# Patient Record
Sex: MALE
[De-identification: names, ages, dates, MRNs, and addresses within clinical notes are randomized per-mention and may not be internally consistent; named-entity substitution may affect disease eponyms.]

---

## 2018-01-02 ENCOUNTER — HOSPITAL ENCOUNTER (EMERGENCY)
Dept: HOSPITAL 42 - ED | Age: 63
Discharge: LEFT BEFORE DISCHARGE | End: 2018-01-02
Payer: COMMERCIAL

## 2018-01-02 VITALS
DIASTOLIC BLOOD PRESSURE: 130 MMHG | OXYGEN SATURATION: 96 % | SYSTOLIC BLOOD PRESSURE: 187 MMHG | TEMPERATURE: 97.5 F | RESPIRATION RATE: 18 BRPM | HEART RATE: 99 BPM

## 2018-01-02 VITALS — BODY MASS INDEX: 38.3 KG/M2

## 2018-01-02 DIAGNOSIS — Z48.02: Primary | ICD-10-CM

## 2018-01-02 NOTE — ED PDOC
Arrival/HPI





- General


Chief Complaint: Suture/Staple Removal


Time Seen by Provider: 01/02/18 11:55


Historian: Patient





- History of Present Illness


Narrative History of Present Illness (Text): 





01/03/18 09:13


63yo male with PMHx of hypertension present to ED for suture removal from the 

left 4th finger. Patient denies redness, purulent discharge, pain to area.





Past Medical History





- Provider Review


Nursing Documentation Reviewed: Yes





- Infectious Disease


Hx of Infectious Diseases: None





- Cardiac


Hx Cardiac Disorders: Yes


Hx Hypertension: Yes





- Pulmonary


Hx Respiratory Disorders: No





- Neurological


Hx Neurological Disorder: No





- HEENT


Hx HEENT Disorder: Yes (EYE IRRITATION FROM DUST AT WORK)





- Renal


Hx Renal Disorder: No





- Endocrine/Metabolic


Hx Endocrine Disorders: No





- Hematological/Oncological


Hx Blood Disorders: No





- Integumentary


Hx Dermatological Disorder: No





- Musculoskeletal/Rheumatological


Hx Musculoskeletal Disorders: Yes (KNEE PAIN)





- Gastrointestinal


Hx Gastrointestinal Disorders: No





- Genitourinary/Gynecological


Hx Genitourinary Disorders: Yes


Hx Prostate Problems: Yes (ELEVATED PSA/BPH)





- Psychiatric


Hx Psychophysiologic Disorder: No


Hx Anxiety: No


Hx Bipolar Disorder: No


Hx Depression: No


Hx Emotional Abuse: No


Hx Hallucinations: No


Hx Panic Disorder: No


Hx Post Traumatic Stress Disorder: No


Hx Psychosis: No


Hx Physical Abuse: No


Hx Schizophrenia: No


Hx Sexual Abuse: No


Hx Substance Use: No





- Surgical History


Hx Inguinal Hernia Repair: Yes (l)





- Anesthesia


Hx Anesthesia: Yes


Hx Anesthesia Reactions: No


Hx Malignant Hyperthermia: No





- Suicidal Assessment


Feels Threatened In Home Enviroment: No





Family/Social History





- Physician Review


Nursing Documentation Reviewed: Yes


Family/Social History: Unknown Family HX


Smoking Status: Heavy Smoker > 10 Cigarettes Daily


Hx Alcohol Use: Yes


Hx Substance Use: No


Hx Substance Use Treatment: No





Allergies/Home Meds


Allergies/Adverse Reactions: 


Allergies





No Known Allergies Allergy (Verified 10/01/15 14:36)


 








Home Medications: 


 Home Meds











 Medication  Instructions  Recorded  Confirmed


 


Losartan/Hydrochlorothiazide 1 tab PO DAILY 01/02/18 01/02/18





[Losartan-Hctz 100-25 mg Tab]   














Review of Systems





- Physician Review


All systems were reviewed & negative as marked: Yes





- Review of Systems


Constitutional: Normal


Eyes: Normal


ENT: Normal


Respiratory: Normal


Cardiovascular: Normal


Gastrointestinal: Normal


Genitourinary Male: Normal


Musculoskeletal: Normal


Skin: Other (suture removal)


Neurological: Normal


Endocrine: Normal


Hemo/Lymphatic: Normal


Psychiatric: Normal





Physical Exam


Vital Signs Reviewed: Yes


Vital Signs











  Temp Pulse Resp BP Pulse Ox


 


 01/02/18 11:49  97.5 F L  99 H  18  187/130 H  96











Temperature: Afebrile


Blood Pressure: Hypertensive


Pulse: Regular


Respiratory Rate: Normal


Appearance: Positive for: Well-Appearing, Non-Toxic, Comfortable


Pain Distress: None


Mental Status: Positive for: Alert and Oriented X 3





- Systems Exam


Head: Present: Atraumatic, Normocephalic


Pupils: Present: PERRL


Extroacular Muscles: Present: EOMI


Conjunctiva: Present: Normal


Mouth: Present: Moist Mucous Membranes


Neck: Present: Normal Range of Motion


Respiratory/Chest: Present: Clear to Auscultation, Good Air Exchange.  No: 

Respiratory Distress, Accessory Muscle Use


Cardiovascular: Present: Regular Rate and Rhythm, Normal S1, S2.  No: Murmurs


Abdomen: Present: Normal Bowel Sounds.  No: Tenderness, Distention, Peritoneal 

Signs


Back: Present: Normal Inspection


Upper Extremity: Present: Normal Inspection.  No: Cyanosis, Edema


Lower Extremity: Present: Normal Inspection.  No: Edema


Neurological: Present: GCS=15, CN II-XII Intact, Speech Normal


Skin: Present: Warm, Dry, Normal Color, Other (3sutures noted intact to left 

4th distal finger. No erythema. No purulent discharge. No sign of infection 

noted).  No: Rashes


Psychiatric: Present: Alert, Oriented x 3, Normal Insight, Normal Concentration





Medical Decision Making


ED Course and Treatment: 





01/03/18 09:15


Pt eloped from the ED before sutures could be removed and before re evaluation 

of his BP.





Disposition/Present on Arrival





- Present on Arrival


Any Indicators Present on Arrival: No


History of DVT/PE: No


History of Uncontrolled Diabetes: No


Urinary Catheter: No


History of Decub. Ulcer: No


History Surgical Site Infection Following: None





- Disposition


Have Diagnosis and Disposition been Completed?: Yes


Diagnosis: 


 Visit for suture removal





Disposition: LEFT W/O TREATMENT - ER ONLY


Disposition Time: 12:45


Condition: GOOD


Discharge Instructions (ExitCare):  Stitches Removal (ED)


Additional Instructions: 


Follow up with your Doctor


Return to ED for any new symptom


Referrals: 


Corey Benedict APN [Primary Care Provider] - Follow up with primary


Forms:  ZAO Begun (English)

## 2018-05-23 ENCOUNTER — HOSPITAL ENCOUNTER (INPATIENT)
Dept: HOSPITAL 42 - ED | Age: 63
LOS: 1 days | Discharge: LEFT BEFORE DISCHARGE | DRG: 749 | End: 2018-05-24
Attending: HOSPITALIST | Admitting: INTERNAL MEDICINE
Payer: COMMERCIAL

## 2018-05-23 VITALS — BODY MASS INDEX: 40.9 KG/M2

## 2018-05-23 VITALS — RESPIRATION RATE: 20 BRPM

## 2018-05-23 DIAGNOSIS — K27.9: ICD-10-CM

## 2018-05-23 DIAGNOSIS — N40.0: ICD-10-CM

## 2018-05-23 DIAGNOSIS — F10.129: Primary | ICD-10-CM
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
## 2018-05-23 DIAGNOSIS — Z91.14: ICD-10-CM

## 2018-05-23 DIAGNOSIS — Z91.19: ICD-10-CM

## 2018-05-23 DIAGNOSIS — F41.9: ICD-10-CM

## 2018-05-23 DIAGNOSIS — K29.20: ICD-10-CM

## 2018-05-23 DIAGNOSIS — F17.210: ICD-10-CM

## 2018-05-23 DIAGNOSIS — F32.9: ICD-10-CM

## 2018-05-23 DIAGNOSIS — I10: ICD-10-CM

## 2018-05-23 DIAGNOSIS — K92.1: ICD-10-CM

## 2018-05-23 LAB
ALBUMIN SERPL-MCNC: 3.8 G/DL (ref 3–4.8)
ALBUMIN/GLOB SERPL: 1.5 {RATIO} (ref 1.1–1.8)
ALT SERPL-CCNC: 41 U/L (ref 7–56)
AMORPH SED URNS QL MICRO: (no result)
APPEARANCE UR: CLEAR
AST SERPL-CCNC: 40 U/L (ref 17–59)
BACTERIA #/AREA URNS HPF: (no result) /[HPF]
BASOPHILS # BLD AUTO: 0.06 K/MM3 (ref 0–2)
BASOPHILS NFR BLD: 0.9 % (ref 0–3)
BILIRUB UR-MCNC: NEGATIVE MG/DL
BNP SERPL-MCNC: 141 PG/ML (ref 0–450)
BUN SERPL-MCNC: 14 MG/DL (ref 7–21)
CALCIUM SERPL-MCNC: 7.9 MG/DL (ref 8.4–10.5)
COLOR UR: YELLOW
EOSINOPHIL # BLD: 0.1 10*3/UL (ref 0–0.7)
EOSINOPHIL NFR BLD: 1.6 % (ref 1.5–5)
EPI CELLS #/AREA URNS HPF: (no result) /HPF (ref 0–5)
ERYTHROCYTE [DISTWIDTH] IN BLOOD BY AUTOMATED COUNT: 13.5 % (ref 11.5–14.5)
GFR NON-AFRICAN AMERICAN: > 60
GLUCOSE UR STRIP-MCNC: NEGATIVE MG/DL
GRANULOCYTES # BLD: 4.64 10*3/UL (ref 1.4–6.5)
GRANULOCYTES NFR BLD: 67.9 % (ref 50–68)
HGB BLD-MCNC: 14.8 G/DL (ref 14–18)
INR PPP: 1.11 (ref 0.93–1.08)
LEUKOCYTE ESTERASE UR-ACNC: NEGATIVE LEU/UL
LIPASE SERPL-CCNC: 34 U/L (ref 23–300)
LYMPHOCYTES # BLD: 1.6 10*3/UL (ref 1.2–3.4)
LYMPHOCYTES NFR BLD AUTO: 23.5 % (ref 22–35)
MCH RBC QN AUTO: 29.4 PG (ref 25–35)
MCHC RBC AUTO-ENTMCNC: 33.8 G/DL (ref 31–37)
MCV RBC AUTO: 87.1 FL (ref 80–105)
MONOCYTES # BLD AUTO: 0.4 10*3/UL (ref 0.1–0.6)
MONOCYTES NFR BLD: 6.1 % (ref 1–6)
PH UR STRIP: 8 [PH] (ref 4.7–8)
PLATELET # BLD: 239 10^3/UL (ref 120–450)
PMV BLD AUTO: 8.9 FL (ref 7–11)
PROT UR STRIP-MCNC: 30 MG/DL
PROTHROMBIN TIME: 12.8 SECONDS (ref 9.4–12.5)
RBC # BLD AUTO: 5.03 10^6/UL (ref 3.5–6.1)
RBC # UR STRIP: NEGATIVE /UL
RBC #/AREA URNS HPF: (no result) /HPF (ref 0–2)
SP GR UR STRIP: 1.01 (ref 1–1.03)
TROPONIN I SERPL-MCNC: < 0.01 NG/ML
UROBILINOGEN UR STRIP-ACNC: 1 E.U./DL
WBC # BLD AUTO: 6.8 10^3/UL (ref 4.5–11)
WBC #/AREA URNS HPF: (no result) /HPF (ref 0–6)

## 2018-05-23 NOTE — CP.PCM.HP
<FernandezKeo rodriguez - Last Filed: 05/23/18 14:09>





History of Present Illness





- History of Present Illness


History of Present Illness: 





Medicine H&P for Dr. Jackson


cc: alcohol intoxication





62 year old Lebanese-speaking male with past medical history of HTN, history of 

alcohol abuse, anxiety/depression and noncompliance presents to the [facility] [place] for 

alcohol intoxication. Patient is reporting nausea/vomiting, diarrhea, and 

abdominal pain since yesterday. He reports that he has been drinking alcohol 

since Thursday. Patient had similar episodes of alcohol abuse in the past. He 

has alternated between months of sobriety and episodes of alcohol consumption. 

He states that he had one occurrence of bright red blood in his stool. He also 

reports increased somnolence. Denies tremors and hallucinations. Patient 

currently rates pain as moderate. He describes it as constant burning in 

epigastrium. Denies any alleviating or aggravating factors. Patient denies 

recent illness or sick contacts. Admits to fever/chills, hematochezia, dizziness

/lightheadedness, vertigo. Denies chest pain, SOB, palpitations, constipation, 

urinary symptoms and incontinence. 





PMD: [facility]





PMH: HTN, history of alcohol abuse, anxiety/depression, noncompliance, BPH


Meds: Name unknown but takes HTN med


Allergy: NKDA


PSH: umbilical hernia repair, Left inguinal hernia repair


FH: non-contributory


Social: current smoker - 1 pack/day for years, heavy EtOH binge drinking 

minimum of 12 beers, denies illicit drug drug





Present on Admission





- Present on Admission


Any Indicators Present on Admission: No


History of DVT/PE: No


History of Uncontrolled Diabetes: No


Urinary Catheter: No


Decubitus Ulcer Present: No


History Surgical Site Infection Following: None





Review of Systems





- Review of Systems


All systems: reviewed and no additional remarkable complaints except (as per HPI

)





Past Patient History





- Infectious Disease


Hx of Infectious Diseases: None





- Past Medical History & Family History


Past Medical History?: Yes





- Past Social History


Smoking Status: Heavy Smoker > 10 Cigarettes Daily





- CARDIAC


Hx Cardiac Disorders: Yes


Hx Hypertension: Yes





- PULMONARY


Hx Respiratory Disorders: No





- NEUROLOGICAL


Hx Neurological Disorder: No





- HEENT


Hx HEENT Problems: Yes (EYE IRRITATION FROM DUST AT WORK)





- RENAL


Hx Chronic Kidney Disease: No





- ENDOCRINE/METABOLIC


Hx Endocrine Disorders: No





- HEMATOLOGICAL/ONCOLOGICAL


Hx Blood Disorders: No





- INTEGUMENTARY


Hx Dermatological Problems: No





- MUSCULOSKELETAL/RHEUMATOLOGICAL


Hx Musculoskeletal Disorders: Yes (KNEE PAIN)





- GASTROINTESTINAL


Hx Gastrointestinal Disorders: No





- GENITOURINARY/GYNECOLOGICAL


Hx Genitourinary Disorders: Yes


Hx Prostate Problems: Yes (ELEVATED PSA/BPH)





- PSYCHIATRIC


Hx Psychophysiologic Disorder: No


Hx Anxiety: No


Hx Bipolar Disorder: No


Hx Depression: No


Hx Emotional Abuse: No


Hx Hallucinations: No


Hx Panic Symptoms: No


Hx Post Traumatic Stress Disorder: No


Hx Psychosis: No


Hx Physical Abuse: No


Hx Schizophrenia: No


Hx Sexual Abuse: No


Hx Substance Use: No





- SURGICAL HISTORY


Hx Surgeries: Yes


Hx Herniorrhaphy: Yes (BILATERAL ING.HERNIORRAPHY)





- ANESTHESIA


Hx Anesthesia: Yes


Hx Anesthesia Reactions: No


Hx Malignant Hyperthermia: No





Meds


Allergies/Adverse Reactions: 


 Allergies











Allergy/AdvReac Type Severity Reaction Status Date / Time


 


No Known Allergies Allergy   Verified 10/01/15 14:36














Physical Exam





- Constitutional


Appears: Other (lethargic)





- Head Exam


Head Exam: ATRAUMATIC, NORMOCEPHALIC





- Eye Exam


Eye Exam: EOMI, Normal appearance


Pupil Exam: PERRL





- ENT Exam


ENT Exam: Mucous Membranes Dry





- Neck Exam


Neck exam: Positive for: Normal Inspection





- Respiratory Exam


Respiratory Exam: NORMAL BREATHING PATTERN





- Cardiovascular Exam


Cardiovascular Exam: REGULAR RHYTHM, +S1, +S2





- GI/Abdominal Exam


GI & Abdominal Exam: Normal Bowel Sounds, Soft, Tenderness (epigastric).  absent

: Distended, Firm, Guarding, Hernia, Rebound, Rigid





- Extremities Exam


Extremities exam: Positive for: normal capillary refill, pedal pulses present.  

Negative for: calf tenderness





- Back Exam


Back exam: absent: CVA tenderness (L), CVA tenderness (R)





- Neurological Exam


Neurological exam: Alert





- Psychiatric Exam


Psychiatric exam: Flat Affect





- Skin


Skin Exam: Dry, Intact, Normal Color, Warm





Results





- Vital Signs


Recent Vital Signs: 





 Last Vital Signs











Temp  98.3 F   05/23/18 09:40


 


Pulse  86   05/23/18 11:24


 


Resp  18   05/23/18 11:24


 


BP  148/94 H  05/23/18 11:24


 


Pulse Ox  98   05/23/18 11:24














- Labs


Result Diagrams: 


 05/23/18 09:50





 05/23/18 09:50





Assessment & Plan





- Assessment and Plan (Free Text)


Assessment: 





62 year old Lebanese-speaking male with past medical history of HTN, history of 

alcohol abuse, anxiety/depression and noncompliance presents to the [facility] for 

alcohol intoxication.


Plan: 





1. Alcohol intoxication/withdrawal


-Remote telemetry


-CIWA protocol


-Aspiration protocol


-Seizure protocol


-Neurochecks


-Banana bag 100 cc/hr


-Ativan 2 mg IVP Q4H PRN


-Intake and Output


-Folic acid


-Thiamine


-MVM








2. HTN


Monitor BP


will start med if persistently high





3. Abdominal pain


gastritis vs PUD


Protonix 40 mg Q12H


Serial abd exams


Monitor bowel function





4. Hematochezia


stool occult test


Protonix


Monitor





5. Prophylactic Measure


Protonix


Lovenox


CLD








Discussed with Dr. Renetta So PGY1


[phone]





<Joshua Jackson - Last Filed: 05/23/18 16:35>





Results





- Vital Signs


Recent Vital Signs: 





 Last Vital Signs











Temp  98.3 F   05/23/18 12:35


 


Pulse  79   05/23/18 12:56


 


Resp  18   05/23/18 12:56


 


BP  145/92 H  05/23/18 12:56


 


Pulse Ox  98   05/23/18 12:56














- Labs


Result Diagrams: 


 05/23/18 09:50





 05/23/18 09:50


Labs: 





 Laboratory Results - last 24 hr











  05/23/18





  12:30


 


Blood Type Confirm  O POSITIVE














Attending/Attestation





- Attestation


I have personally seen and examined this patient.: Yes


I have fully participated in the care of the patient.: Yes


I have reviewed all pertinent clinical information: Yes


Notes (Text): 





Patient seen and examined


Agree with above


Patient admitted for etoh intoxication c/o abdominal pain most likely 

attributed to etoh gastritits


IVF hydration with bananabag infusion, Monitor for withdrawal symptoms, CIWA 

protocol


Questionable hematochezia, will send stool for occult blood. Start Protonix


Clear liquids - advance as tolerated


Sister at bedside reports pt drinks more than he says he does.

## 2018-05-23 NOTE — ED PDOC
Arrival/HPI





- General


Chief Complaint: Alcohol Ingestion


Time Seen by Provider: 05/23/18 09:46


Historian: Patient, Spouse, Family (Niece)





- History of Present Illness


Narrative History of Present Illness (Text): 





05/23/18 09:49





62 year old Romansh-speaking male, with past medical history of hypertension (

noncompliant with medication) and psychiatric history of alcohol abuse, anxiety 

and depression, presents to the Emergency department complaining of nausea, 

vomiting, stomach burning and hematochezia since yesterday. Lauren at bedside is 

translating for patient. As per nirashard, patient has been drinking alcohol since 

Thursday and is unable to stop. Niece states patient had similar prior episodes 

of alcohol abuse which followed months of sobriety until alcohol consumption 

again. As translated by lauren, patient informs bright red blood in his stool 

and increased somnolence. Upon questioning wife informs patient feels sick and 

shaky but no hallucination or DT when withdrawing alcohol. Patient currently 

denies any other somatic complaints. Patient denies any fever, chills, chest 

pain, shortness of breath, substance abuse or any other complaints. Patient 

presents to the Emergency department for medical evaluation. 





PMD: NO PMD (Visits [facility], [place], operated by [facility] in [place])


Time/Duration: < week


Symptom Onset: Gradual


Symptom Course: Unchanged


Quality: Burning


Activities at Onset: Light


Context: Other (Drinking Alcohol)





Past Medical History





- Provider Review


Nursing Documentation Reviewed: Yes





- Infectious Disease


Hx of Infectious Diseases: None





- Cardiac


Hx Cardiac Disorders: Yes


Hx Hypertension: Yes





- Pulmonary


Hx Respiratory Disorders: No





- Neurological


Hx Neurological Disorder: No





- HEENT


Hx HEENT Disorder: Yes (EYE IRRITATION FROM DUST AT WORK)





- Renal


Hx Renal Disorder: No





- Endocrine/Metabolic


Hx Endocrine Disorders: No





- Hematological/Oncological


Hx Blood Disorders: No





- Integumentary


Hx Dermatological Disorder: No





- Musculoskeletal/Rheumatological


Hx Musculoskeletal Disorders: Yes (KNEE PAIN)





- Gastrointestinal


Hx Gastrointestinal Disorders: No





- Genitourinary/Gynecological


Hx Genitourinary Disorders: Yes


Hx Prostate Problems: Yes (ELEVATED PSA/BPH)





- Psychiatric


Hx Psychophysiologic Disorder: No


Hx Anxiety: No


Hx Bipolar Disorder: No


Hx Depression: No


Hx Emotional Abuse: No


Hx Hallucinations: No


Hx Panic Disorder: No


Hx Post Traumatic Stress Disorder: No


Hx Psychosis: No


Hx Physical Abuse: No


Hx Schizophrenia: No


Hx Sexual Abuse: No


Hx Substance Use: No





- Surgical History


Hx Inguinal Hernia Repair: Yes (l)





- Anesthesia


Hx Anesthesia: Yes


Hx Anesthesia Reactions: No


Hx Malignant Hyperthermia: No





- Suicidal Assessment


Feels Threatened In Home Enviroment: No





Family/Social History





- Physician Review


Nursing Documentation Reviewed: Yes


Family/Social History: No Known Family HX


Smoking Status: Heavy Smoker > 10 Cigarettes Daily


Hx Alcohol Use: Yes


Hx Substance Use: No


Hx Substance Use Treatment: No





Allergies/Home Meds


Allergies/Adverse Reactions: 


Allergies





No Known Allergies Allergy (Verified 10/01/15 14:36)


 








Home Medications: 


 Home Meds











 Medication  Instructions  Recorded  Confirmed


 


Losartan/Hydrochlorothiazide 1 tab PO DAILY 01/02/18 05/23/18





[Losartan-Hctz 100-25 mg Tab]   














Review of Systems





- Physician Review


All systems were reviewed & negative as marked: Yes





- Review of Systems


Constitutional: Normal.  absent: Fevers


Eyes: Normal


ENT: Normal


Respiratory: Normal.  absent: SOB


Cardiovascular: Normal.  absent: Chest Pain


Gastrointestinal: Abdominal Pain, Nausea, Vomiting, Hematochezia, Hematemesis


Genitourinary Male: Normal


Musculoskeletal: Normal


Skin: Normal


Neurological: Other (Intoxicated)


Endocrine: Normal


Hemo/Lymphatic: Normal


Psychiatric: Anxiety, Depression





Physical Exam


Vital Signs Reviewed: Yes


Vital Signs











  Temp Pulse Resp BP Pulse Ox


 


 05/23/18 11:24   86  18  148/94 H  98


 


 05/23/18 09:40  98.3 F  98 H  19  152/112 H  98











Temperature: Afebrile


Blood Pressure: Hypertensive


Pulse: Tachycardic


Respiratory Rate: Normal


Appearance: Positive for: Other (Strong Alcohol smell at breath. )


Pain Distress: None


Mental Status: Positive for: Alert and Oriented X 3, other





- Systems Exam


Head: Present: Atraumatic, Normocephalic


Pupils: Present: PERRL


Extroacular Muscles: Present: EOMI


Conjunctiva: Present: Normal


Respiratory/Chest: Present: Clear to Auscultation, Good Air Exchange.  No: 

Respiratory Distress, Accessory Muscle Use


Cardiovascular: Present: Regular Rate and Rhythm, Normal S1, S2.  No: Murmurs


Abdomen: Present: Tenderness, Distention.  No: Peritoneal Signs


Upper Extremity: Present: Normal Inspection.  No: Cyanosis, Edema


Lower Extremity: Present: Normal Inspection.  No: Edema


Neurological: Present: GCS=15, CN II-XII Intact, Speech Normal


Skin: Present: Warm, Dry, Normal Color.  No: Rashes


Psychiatric: Present: Alert, Oriented x 3, Intoxicated





Medical Decision Making


ED Course and Treatment: 





05/23/18 9:47





Impression: 62 year old male presents to the Emergency department for nausea, 

hematemesis, hematochezia, stomach burning and alcohol intoxication.





Plan:


-- Blood Type and Screen


-- Labs


-- IV Fluids


-- Urinalysis


-- Reassess and disposition





Progress Notes:


 


05/23/18 12:35


EKG: Ordered, reviewed, and independently interpreted the EKG.


Rate : 83 BPM


Rhythm : NSR


Interpretation : LVH








- Lab Interpretations


Lab Results: 








 05/23/18 09:50 





 05/23/18 09:50 





 Lab Results





05/23/18 10:20: Urine Opiates Screen Negative, Urine Methadone Screen Negative, 

Ur Barbiturates Screen Negative, Ur Phencyclidine Scrn Negative, Ur 

Amphetamines Screen Negative, U Benzodiazepines Scrn Negative, U Oth Cocaine 

Metabols Negative, U Cannabinoids Screen Negative


05/23/18 10:20: Urine Color Yellow, Urine Appearance Clear, Urine pH 8.0, Ur 

Specific Gravity 1.015, Urine Protein 30 H, Urine Glucose (UA) Negative, Urine 

Ketones Negative, Urine Blood Negative, Urine Nitrate Negative, Urine Bilirubin 

Negative, Urine Urobilinogen 1.0 H, Ur Leukocyte Esterase Negative, Urine RBC 0 

- 2, Urine WBC 0 - 2, Ur Epithelial Cells None, Amorphous Sediment Few, Urine 

Bacteria Many, Urine Other Uyeast


05/23/18 09:50: Blood Type O POSITIVE, Antibody Screen Negative, BBK History 

Checked No verified bt


05/23/18 09:50: Alcohol, Quantitative 228 H


05/23/18 09:50: Sodium 145, Potassium 3.9, Chloride 104, Carbon Dioxide 26, 

Anion Gap 19, BUN 14, Creatinine 0.7 L, Est GFR ( Amer) > 60, Est GFR (

Non-Af Amer) > 60, Random Glucose 134 H, Calcium 7.9 L, Total Bilirubin 0.4, 

AST 40, ALT 41, Alkaline Phosphatase 65, Lactate Dehydrogenase 619, Total 

Creatine Kinase 206, Troponin I < 0.01  D, NT-Pro-B Natriuret Pep 141, Total 

Protein 6.4, Albumin 3.8, Globulin 2.6, Albumin/Globulin Ratio 1.5, Lipase 34


05/23/18 09:50: PT 12.8 H, INR 1.11 H


05/23/18 09:50: WBC 6.8  D, RBC 5.03, Hgb 14.8, Hct 43.8, MCV 87.1, MCH 29.4, 

MCHC 33.8, RDW 13.5, Plt Count 239, MPV 8.9, Gran % 67.9, Lymph % (Auto) 23.5, 

Mono % (Auto) 6.1 H, Eos % (Auto) 1.6, Baso % (Auto) 0.9, Gran # 4.64, Lymph # (

Auto) 1.6, Mono # (Auto) 0.4, Eos # (Auto) 0.1, Baso # (Auto) 0.06











- EKG Interpretation


Interpreted by ED Physician: Yes


Type: 12 lead EKG





- Medication Orders


Current Medication Orders: 








Enoxaparin Sodium (Lovenox)  40 mg SC DAILY MANUEL


   PRN Reason: Protocol


Folic Acid (Folic Acid)  1 mg PO DAILY [facility]


Multivitamins/Vitamin C 10 ml/Thiamine HCl 100 mg/ Folic Acid 1 mg/ Sodium 

Chloride  1,011.2 mls @ 100 mls/hr IV .Q10H7M MANUEL


Lorazepam (Ativan)  1 mg PO Q4H PRN


   PRN Reason: Symptoms of alcohol withdrawl


Multivitamins/Minerals (Therapeutic-M Tab)  1 tab PO DAILY [facility]


Ondansetron HCl (Zofran Inj)  4 mg IVP Q6 PRN


   PRN Reason: Nausea/Vomiting


Pantoprazole Sodium (Protonix Inj)  40 mg IVP DAILY [facility]


Thiamine HCl (Vitamin B1 Tab)  100 mg PO DAILY [facility]





Discontinued Medications





Al Hydrox/Mg Hydrox/Simethicone (Maalox Plus 30 Ml)  30 ml PO STAT STA


   Stop: 05/23/18 11:10


   Last Admin: 05/23/18 12:11  Dose: 30 ml





Belladonna/Phenobarbital (Donnatal Elixir)  5 ml PO STAT STA


   Stop: 05/23/18 11:10


   Last Admin: 05/23/18 12:11  Dose: 5 ml





Famotidine (Pepcid)  40 mg IVP STAT STA


   Stop: 05/23/18 11:26


   Last Admin: 05/23/18 12:12  Dose: 40 mg





IVP Administration


 Document     05/23/18 12:12  SS  (Rec: 05/23/18 12:12  SS  [id number])


     Charges for Administration


      # of IVP Administrations                   1





Multivitamins/Vitamin C 10 ml/Thiamine HCl 100 mg/ Folic Acid 1 mg/ Sodium 

Chloride  1,011.2 mls @ 1,000 mls/hr IV .Q1H1M ONE


   Stop: 05/23/18 10:47


   Last Admin: 05/23/18 10:24  Dose: 1,000 mls/hr





eMAR Start Stop


 Document     05/23/18 10:24  GMD  (Rec: 05/23/18 10:25  GMD  [id number])


     Intravenous Solution


      Start Date                                 05/23/18


      Start Time                                 10:24


      End Date                                   05/23/18


      End time                                   11:25


      Total Infusion Time                        61





Lidocaine HCl (Lidocaine 2% Viscous)  15 ml MM STAT STA


   Stop: 05/23/18 11:10


   Last Admin: 05/23/18 12:11  Dose: 15 ml





Lorazepam (Ativan)  2 mg IVP ONCE ONE


   PRN Reason: Protocol


   Stop: 05/23/18 11:26


   Last Admin: 05/23/18 12:12  Dose: 2 mg





IVP Administration


 Document     05/23/18 12:12  SS  (Rec: 05/23/18 12:12  SS  [id number])


     Charges for Administration


      # of IVP Administrations                   1





Pantoprazole Sodium (Protonix Inj)  80 mg IVP STAT STA


   Stop: 05/23/18 11:26


   Last Admin: 05/23/18 12:11  Dose: 80 mg





IVP Administration


 Document     05/23/18 12:11  SS  (Rec: 05/23/18 12:11  SS  [id number])


     Charges for Administration


      # of IVP Administrations                   1














- Scribe Statement


The provider has reviewed the documentation as recorded by the Scribe


Lucia Freire.





All medical record entries made by the Scribe were at my direction and 

personally dictated by me. I have reviewed the chart and agree that the record 

accurately reflects my personal performance of the history, physical exam, 

medical decision making, and the department course for this patient. I have 

also personally directed, reviewed, and agree with the discharge instructions 

and disposition.





Disposition/Present on Arrival





- Present on Arrival


Any Indicators Present on Arrival: No


History of DVT/PE: No


History of Uncontrolled Diabetes: No


Urinary Catheter: No


History of Decub. Ulcer: No


History Surgical Site Infection Following: None





- Disposition


Have Diagnosis and Disposition been Completed?: Yes


Diagnosis: 


 Alcohol intoxication, Upper GI bleeding





Disposition: HOSPITALIZED


Disposition Time: 11:35


Patient Plan: Admission


Patient Problems: 


 Current Active Problems











Problem Status Onset


 


Alcohol intoxication Acute  


 


Upper GI bleeding Acute  











Condition: FAIR

## 2018-05-24 VITALS
SYSTOLIC BLOOD PRESSURE: 151 MMHG | HEART RATE: 114 BPM | DIASTOLIC BLOOD PRESSURE: 95 MMHG | TEMPERATURE: 98.9 F | OXYGEN SATURATION: 95 %

## 2018-05-24 LAB
ALBUMIN SERPL-MCNC: 4 G/DL (ref 3–4.8)
ALBUMIN/GLOB SERPL: 1.5 {RATIO} (ref 1.1–1.8)
ALT SERPL-CCNC: 56 U/L (ref 7–56)
APTT BLD: 25.4 SECONDS (ref 25.1–36.5)
AST SERPL-CCNC: 55 U/L (ref 17–59)
BASOPHILS # BLD AUTO: 0.04 K/MM3 (ref 0–2)
BASOPHILS NFR BLD: 0.4 % (ref 0–3)
BUN SERPL-MCNC: 12 MG/DL (ref 7–21)
CALCIUM SERPL-MCNC: 8.3 MG/DL (ref 8.4–10.5)
EOSINOPHIL # BLD: 0.4 10*3/UL (ref 0–0.7)
EOSINOPHIL NFR BLD: 3.6 % (ref 1.5–5)
ERYTHROCYTE [DISTWIDTH] IN BLOOD BY AUTOMATED COUNT: 13.3 % (ref 11.5–14.5)
GFR NON-AFRICAN AMERICAN: > 60
GRANULOCYTES # BLD: 6.94 10*3/UL (ref 1.4–6.5)
GRANULOCYTES NFR BLD: 69.9 % (ref 50–68)
HGB BLD-MCNC: 15.5 G/DL (ref 14–18)
INR PPP: 1.03 (ref 0.93–1.08)
LYMPHOCYTES # BLD: 1.8 10*3/UL (ref 1.2–3.4)
LYMPHOCYTES NFR BLD AUTO: 18.4 % (ref 22–35)
MCH RBC QN AUTO: 29.6 PG (ref 25–35)
MCHC RBC AUTO-ENTMCNC: 34 G/DL (ref 31–37)
MCV RBC AUTO: 87 FL (ref 80–105)
MONOCYTES # BLD AUTO: 0.8 10*3/UL (ref 0.1–0.6)
MONOCYTES NFR BLD: 7.7 % (ref 1–6)
PLATELET # BLD: 240 10^3/UL (ref 120–450)
PMV BLD AUTO: 9.3 FL (ref 7–11)
PROTHROMBIN TIME: 11.8 SECONDS (ref 9.4–12.5)
RBC # BLD AUTO: 5.24 10^6/UL (ref 3.5–6.1)
WBC # BLD AUTO: 9.9 10^3/UL (ref 4.5–11)

## 2018-05-24 NOTE — CARD
--------------- APPROVED REPORT --------------





EKG Measurement

Heart [id number]

MN [id number]

[id number]

[id number]

[id number]



<Conclusion>

Normal sinus rhythm

Minimal voltage criteria for LVH, may be normal variant

## 2018-05-24 NOTE — CP.PCM.PN
<Keo So - Last Filed: 05/24/18 12:22>





Subjective





- Date & Time of Evaluation


Date of Evaluation: 05/24/18


Time of Evaluation: 07:30





- Subjective


Subjective: 





Medicine Note for Dr. Ayoub





Patient seen and examined at bedside. No acute event overnight. Patient 

required ativan multiple times overnight due to [facility] protocol. Patient is 

diaphoretic this morning with mild tremors. He denies pain. Denies fever/chills 

and nausea/vomiting.





Objective





- Vital Signs/Intake and Output


Vital Signs (last 24 hours): 


 











Temp Pulse Resp BP Pulse Ox


 


 98.8 F   110 H  20   174/102 H  98 


 


 05/24/18 08:18  05/24/18 10:43  05/24/18 08:18  05/24/18 10:43  05/24/18 08:18








Intake and Output: 


 











 05/24/18 05/24/18





 06:59 18:59


 


Intake Total 660 


 


Output Total 775 


 


Balance -115 














- Medications


Medications: 


 Current Medications





Chlordiazepoxide (Librium)  25 mg PO Q8 [facility]


   PRN Reason: Protocol


   Last Admin: 05/24/18 09:05 Dose:  25 mg


Enoxaparin Sodium (Lovenox)  40 mg SC DAILY [facility]


   PRN Reason: Protocol


   Last Admin: 05/24/18 09:06 Dose:  40 mg


Folic Acid (Folic Acid)  1 mg PO DAILY [facility]


   Last Admin: 05/24/18 09:06 Dose:  1 mg


Lisinopril (Zestril)  10 mg PO DAILY [facility]


   Last Admin: 05/24/18 10:43 Dose:  10 mg


Lorazepam (Ativan)  2 mg IVP Q4H PRN; Protocol


   PRN Reason: etoh withdrawal


   Last Admin: 05/24/18 06:31 Dose:  2 mg


Losartan Potassium (Cozaar)  25 mg PO DAILY [facility]


   Last Admin: 05/24/18 09:05 Dose:  25 mg


Multivitamins/Minerals (Therapeutic-M Tab)  1 tab PO DAILY [facility]


   Last Admin: 05/24/18 09:04 Dose:  1 tab


Ondansetron HCl (Zofran Inj)  4 mg IVP Q6 PRN


   PRN Reason: Nausea/Vomiting


   Last Admin: 05/24/18 01:26 Dose:  4 mg


Pantoprazole Sodium (Protonix Inj)  40 mg IVP BID [facility]


   Last Admin: 05/24/18 09:04 Dose:  40 mg


Thiamine HCl (Vitamin B1 Tab)  100 mg PO DAILY [facility]


   Last Admin: 05/24/18 09:06 Dose:  100 mg











- Labs


Labs: 


 





 05/24/18 06:00 





 05/24/18 06:00 





 











PT  11.8 SECONDS (9.4-12.5)   05/24/18  06:00    


 


INR  1.03  (0.93-1.08)   05/24/18  06:00    


 


APTT  25.4 Seconds (25.1-36.5)   05/24/18  06:00    














- Constitutional


Appears: No Acute Distress, Other (diaphoretic)





- Head Exam


Head Exam: ATRAUMATIC, NORMOCEPHALIC





- Eye Exam


Eye Exam: EOMI, Normal appearance


Pupil Exam: PERRL





- ENT Exam


ENT Exam: Mucous Membranes Moist





- Respiratory Exam


Respiratory Exam: Clear to Ausculation Bilateral, NORMAL BREATHING PATTERN





- Cardiovascular Exam


Cardiovascular Exam: Tachycardia





- GI/Abdominal Exam


GI & Abdominal Exam: Soft, Normal Bowel Sounds.  absent: Tenderness





- Extremities Exam


Extremities Exam: Normal Capillary Refill





- Back Exam


Back Exam: absent: CVA tenderness (L), CVA tenderness (R)





- Neurological Exam


Neurological Exam: Alert, Awake, Oriented x3


Additional comments: 





mild tremor





- Psychiatric Exam


Psychiatric exam: Normal Affect, Normal Mood





- Skin


Skin Exam: Diaphoretic, Intact, Warm





Assessment and Plan





- Assessment and Plan (Free Text)


Assessment: 





62 year old Citizen of the [place]-speaking male with past medical history of HTN, history of 

alcohol abuse, anxiety/depression and noncompliance [place] the [facility] [place] 

alcohol intoxication.


Plan: 





1. Alcohol intoxication/withdrawal


-Remote telemetry


-CIWA protocol


-Aspiration protocol


-Seizure protocol


-Neurochecks


-Banana bag 150 cc/hr


-Librium 25 mg PO Q6H


-Clonidine 0.1 Q6H PRN


-Ativan 2 mg IVP Q4H PRN


-Intake and Output


-Folic acid


-Thiamine


-MVM








2. HTN


Lisinopril 10mg PO daily


Clonidine 0.1 Q6H PRN


Monitor BP


will start med if persistently high





3. Abdominal pain; Resolved


gastritis vs PUD


Protonix 40 mg Q12H


Serial abd exams


Monitor bowel function


Lipase normal





4. Hematochezia


stool occult test


Protonix


Monitor





5. Prophylactic Measure


Protonix


Lovenox


CLD





Discussed with Dr. Anitra So PGY1


[phone]





<Grace Ayoub B - Last Filed: 05/24/18 16:21>





Objective





- Vital Signs/Intake and Output


Vital Signs (last 24 hours): 


 











Temp Pulse Resp BP Pulse Ox


 


 98.8 F   105 H  20   179/92 H  98 


 


 05/24/18 08:18  05/24/18 15:40  05/24/18 08:18  05/24/18 15:40  05/24/18 08:18








Intake and Output: 


 











 05/24/18 05/24/18





 06:59 18:59


 


Intake Total 660 925


 


Output Total 775 


 


Balance -115 925














- Medications


Medications: 


 Current Medications





Chlordiazepoxide (Librium)  25 mg PO Q8 [facility]


   PRN Reason: Protocol


   Last Admin: 05/24/18 13:35 Dose:  25 mg


Clonidine HCl (Catapres)  0.1 mg PO Q6H PRN


   PRN Reason: Systolic Blood Pressure


   Last Admin: 05/24/18 12:30 Dose:  0.1 mg


Enoxaparin Sodium (Lovenox)  40 mg SC DAILY [facility]


   PRN Reason: Protocol


   Last Admin: 05/24/18 09:06 Dose:  40 mg


Folic Acid (Folic Acid)  1 mg PO DAILY [facility]


   Last Admin: 05/24/18 09:06 Dose:  1 mg


Lisinopril (Zestril)  10 mg PO DAILY [facility]


   Last Admin: 05/24/18 10:43 Dose:  10 mg


Lorazepam (Ativan)  2 mg IVP Q4H PRN; Protocol


   PRN Reason: etoh withdrawal


   Last Admin: 05/24/18 11:46 Dose:  2 mg


Losartan Potassium (Cozaar)  25 mg PO DAILY [facility]


   Last Admin: 05/24/18 09:05 Dose:  25 mg


Multivitamins/Minerals (Therapeutic-M Tab)  1 tab PO DAILY [facility]


   Last Admin: 05/24/18 09:04 Dose:  1 tab


Ondansetron HCl (Zofran Inj)  4 mg IVP Q6 PRN


   PRN Reason: Nausea/Vomiting


   Last Admin: 05/24/18 01:26 Dose:  4 mg


Pantoprazole Sodium (Protonix Ec Tab)  40 mg PO BID [facility]


Thiamine HCl (Vitamin B1 Tab)  100 mg PO DAILY [facility]


   Last Admin: 05/24/18 09:06 Dose:  100 mg











- Labs


Labs: 


 





 05/24/18 06:00 





 05/24/18 06:00 





 











PT  11.8 SECONDS (9.4-12.5)   05/24/18  06:00    


 


INR  1.03  (0.93-1.08)   05/24/18  06:00    


 


APTT  25.4 Seconds (25.1-36.5)   05/24/18  06:00    














Attending/Attestation





- Attestation


I have personally seen and examined this patient.: Yes


I have fully participated in the care of the patient.: Yes


I have reviewed all pertinent clinical information, including history, physical 

exam and plan: Yes


Notes (Text): 


I have seen and examined the patient at bedside. Agree with the above note with 

the following additions/ exceptions: Briefly this is 62 year old Citizen of the [place]-

speaking male with past medical history of HTN, history of alcohol abuse, 

anxiety/depression and noncompliance who came for management of alcohol 

intoxication. Continue CIWA protocol. Continue librium taper. Will closely 

monitor BP and electrolytes. Start clonidine prn.

## 2018-05-25 NOTE — CP.PCM.DIS
Provider





- Provider


Date of Admission: 


05/23/18 11:36





Attending physician: 


Grace Ayoub MD





Time Spent in preparation of Discharge (in minutes): 30





Hospital Course





- Lab Results


Lab Results: 


 Most Recent Lab Values











WBC  9.9 10^3/ul (4.5-11.0)  D 05/24/18  06:00    


 


RBC  5.24 10^6/uL (3.5-6.1)   05/24/18  06:00    


 


Hgb  15.5 g/dL (14.0-18.0)   05/24/18  06:00    


 


Hct  45.6 % (42.0-52.0)   05/24/18  06:00    


 


MCV  87.0 fl (80.0-105.0)   05/24/18  06:00    


 


MCH  29.6 pg (25.0-35.0)   05/24/18  06:00    


 


MCHC  34.0 g/dl (31.0-37.0)   05/24/18  06:00    


 


RDW  13.3 % (11.5-14.5)   05/24/18  06:00    


 


Plt Count  240 10^3/uL (120.0-450.0)   05/24/18  06:00    


 


MPV  9.3 fl (7.0-11.0)   05/24/18  06:00    


 


Gran %  69.9 % (50.0-68.0)  H  05/24/18  06:00    


 


Lymph % (Auto)  18.4 % (22.0-35.0)  L  05/24/18  06:00    


 


Mono % (Auto)  7.7 % (1.0-6.0)  H  05/24/18  06:00    


 


Eos % (Auto)  3.6 % (1.5-5.0)   05/24/18  06:00    


 


Baso % (Auto)  0.4 % (0.0-3.0)   05/24/18  06:00    


 


Gran #  6.94  (1.4-6.5)  H  05/24/18  06:00    


 


Lymph # (Auto)  1.8  (1.2-3.4)   05/24/18  06:00    


 


Mono # (Auto)  0.8  (0.1-0.6)  H  05/24/18  06:00    


 


Eos # (Auto)  0.4  (0.0-0.7)   05/24/18  06:00    


 


Baso # (Auto)  0.04 K/mm3 (0.0-2.0)   05/24/18  06:00    


 


PT  11.8 SECONDS (9.4-12.5)   05/24/18  06:00    


 


INR  1.03  (0.93-1.08)   05/24/18  06:00    


 


APTT  25.4 Seconds (25.1-36.5)   05/24/18  06:00    


 


Sodium  142 mmol/L (132-148)   05/24/18  06:00    


 


Potassium  4.0 mmol/L (3.6-5.0)   05/24/18  06:00    


 


Chloride  104 mmol/L ()   05/24/18  06:00    


 


Carbon Dioxide  26 mmol/L (21-33)   05/24/18  06:00    


 


Anion Gap  16  (10-20)   05/24/18  06:00    


 


BUN  12 mg/dL (7-21)   05/24/18  06:00    


 


Creatinine  0.8 mg/dl (0.8-1.5)   05/24/18  06:00    


 


Est GFR ( Amer)  > 60   05/24/18  06:00    


 


Est GFR (Non-Af Amer)  > 60   05/24/18  06:00    


 


POC Glucose (mg/dL)  124 mg/dL ()  H  05/23/18  10:05    


 


Random Glucose  98 mg/dL ()   05/24/18  06:00    


 


Calcium  8.3 mg/dL (8.4-10.5)  L  05/24/18  06:00    


 


Phosphorus  3.0 mg/dL (2.5-4.5)   05/24/18  06:00    


 


Magnesium  2.2 mg/dL (1.7-2.2)   05/24/18  06:00    


 


Total Bilirubin  1.0 mg/dL (0.2-1.3)   05/24/18  06:00    


 


AST  55 U/L (17-59)   05/24/18  06:00    


 


ALT  56 U/L (7-56)   05/24/18  06:00    


 


Alkaline Phosphatase  74 U/L ()   05/24/18  06:00    


 


Lactate Dehydrogenase  619 U/L (333-699)   05/23/18  09:50    


 


Total Creatine Kinase  206 U/L ()   05/23/18  09:50    


 


Troponin I  < 0.01 ng/mL D 05/23/18  09:50    


 


NT-Pro-B Natriuret Pep  141 pg/mL (0-450)   05/23/18  09:50    


 


Total Protein  6.6 g/dL (5.8-8.3)   05/24/18  06:00    


 


Albumin  4.0 g/dL (3.0-4.8)   05/24/18  06:00    


 


Globulin  2.6 gm/dL  05/24/18  06:00    


 


Albumin/Globulin Ratio  1.5  (1.1-1.8)   05/24/18  06:00    


 


Lipase  34 U/L ()   05/23/18  09:50    


 


Urine Color  Yellow  (YELLOW)   05/23/18  10:20    


 


Urine Appearance  Clear  (CLEAR)   05/23/18  10:20    


 


Urine pH  8.0  (4.7-8.0)   05/23/18  10:20    


 


Ur Specific Gravity  1.015  (1.005-1.035)   05/23/18  10:20    


 


Urine Protein  30 mg/dL (<30 mg/dL)  H  05/23/18  10:20    


 


Urine Glucose (UA)  Negative mg/dL (NEGATIVE)   05/23/18  10:20    


 


Urine Ketones  Negative mg/dL (NEGATIVE)   05/23/18  10:20    


 


Urine Blood  Negative  (NEGATIVE)   05/23/18  10:20    


 


Urine Nitrate  Negative  (NEGATIVE)   05/23/18  10:20    


 


Urine Bilirubin  Negative  (NEGATIVE)   05/23/18  10:20    


 


Urine Urobilinogen  1.0 E.U./dL (<1 E.U./dL)  H  05/23/18  10:20    


 


Ur Leukocyte Esterase  Negative Bhavani/uL (NEGATIVE)   05/23/18  10:20    


 


Urine RBC  0 - 2 /hpf (0-2)   05/23/18  10:20    


 


Urine WBC  0 - 2 /hpf (0-6)   05/23/18  10:20    


 


Ur Epithelial Cells  None /hpf (0-5)   05/23/18  10:20    


 


Amorphous Sediment  Few   05/23/18  10:20    


 


Urine Bacteria  Many  (NEG)   05/23/18  10:20    


 


Urine Other  Uyeast   05/23/18  10:20    


 


Urine Opiates Screen  Negative  (NEGATIVE)   05/23/18  10:20    


 


Urine Methadone Screen  Negative  (NEGATIVE)   05/23/18  10:20    


 


Ur Barbiturates Screen  Negative  (NEGATIVE)   05/23/18  10:20    


 


Ur Phencyclidine Scrn  Negative  (NEGATIVE)   05/23/18  10:20    


 


Ur Amphetamines Screen  Negative  (NEGATIVE)   05/23/18  10:20    


 


U Benzodiazepines Scrn  Negative  (NEGATIVE)   05/23/18  10:20    


 


U Oth Cocaine Metabols  Negative  (NEGATIVE)   05/23/18  10:20    


 


U Cannabinoids Screen  Negative  (NEGATIVE)   05/23/18  10:20    


 


Alcohol, Quantitative  228 mg/dL (0-10)  H  05/23/18  09:50    


 


Blood Type  O POSITIVE   05/23/18  09:50    


 


Blood Type Confirm  O POSITIVE   05/23/18  12:30    


 


Antibody Screen  Negative   05/23/18  09:50    


 


BBK History Checked  No verified bt   05/23/18  09:50    














- Hospital Course


Hospital Course: 


Patient is a 61yo [place]-speaking male with history of hypertension, alcohol 

abuse, anxiety/depression that presented to [facility] [place] with alcohol intoxication. On 

presentation he had reported nausea, non-bilious non-bloody vomiting, diarrhea 

and epigastric abdominal pain. He admitted to drinking excessive amounts of 

alcohol prior to his admission and had similar instances in the past. He 

reported that he would typically have months of sobriety with episodes of binge 

drinking in between. On admission, he was admitted to remote telemetry, started 

on CIWA protocol with seizure and aspiration precautions. He was given librium 

and ativan for signs/symptoms of alcohol withdrawal as well as treated for 

hypertension. He was also started on multivitamins, folic acid and thiamine. 

Lipase was within normal limits. Although improved since admission, he had 

symptoms of alcohol withdrawal and was advised to remain in the hospital for 

treatment of alcohol withdrawal. At approximately 7pm, patient reported that he 

wishes to leave. Despite attempts to dissuade him and explaining the risks/

benefits of leaving against medical advice, patient left [facility] [place]. 





Physical exam:


Please see note from 5/24/18 for physical examination





Discharge Exam





- Head Exam


Head Exam: ATRAUMATIC, NORMOCEPHALIC





Discharge Plan





- Follow Up Plan


Condition: FAIR


Disposition: AGAINST MEDICAL ADVICE

## 2018-05-27 ENCOUNTER — HOSPITAL ENCOUNTER (EMERGENCY)
Dept: HOSPITAL 42 - ED | Age: 63
Discharge: HOME | End: 2018-05-27
Payer: COMMERCIAL

## 2018-05-27 VITALS
OXYGEN SATURATION: 99 % | TEMPERATURE: 98.9 F | HEART RATE: 78 BPM | DIASTOLIC BLOOD PRESSURE: 95 MMHG | RESPIRATION RATE: 18 BRPM | SYSTOLIC BLOOD PRESSURE: 148 MMHG

## 2018-05-27 VITALS — BODY MASS INDEX: 40.6 KG/M2

## 2018-05-27 DIAGNOSIS — L08.9: Primary | ICD-10-CM

## 2018-05-27 DIAGNOSIS — I10: ICD-10-CM

## 2018-05-27 DIAGNOSIS — F17.210: ICD-10-CM

## 2018-05-27 NOTE — ED PDOC
Arrival/HPI





- General


Chief Complaint: Abnormal Skin Integrity


Time Seen by Provider: 05/27/18 10:42


Historian: Patient





- History of Present Illness


Narrative History of Present Illness (Text): 





05/27/18 10:42


This 63 yo male presents to this ED requesting antibiotic for upper lip 

infection.  Patient stated he tripped and hit his mouth again his bathroom sink 

x 3 days ago.  Patient noted inner upper lip laceration.  Patient denies FB 

within lip.  Denies other somatic complains.


Tetanus <1 year


Time/Duration: Other (see hpi)


Context: Home





Past Medical History





- Provider Review


Nursing Documentation Reviewed: Yes





- Infectious Disease


Hx of Infectious Diseases: None





- Cardiac


Hx Cardiac Disorders: Yes


Hx Hypertension: Yes





- Pulmonary


Hx Respiratory Disorders: No





- Neurological


Hx Neurological Disorder: No





- HEENT


Hx HEENT Disorder: Yes (EYE IRRITATION FROM DUST AT WORK)





- Renal


Hx Renal Disorder: No





- Endocrine/Metabolic


Hx Endocrine Disorders: No





- Hematological/Oncological


Hx Blood Disorders: No





- Integumentary


Hx Dermatological Disorder: No





- Musculoskeletal/Rheumatological


Hx Musculoskeletal Disorders: Yes (KNEE PAIN)





- Gastrointestinal


Hx Gastrointestinal Disorders: No





- Genitourinary/Gynecological


Hx Genitourinary Disorders: Yes


Hx Prostate Problems: Yes (ELEVATED PSA/BPH)





- Psychiatric


Hx Psychophysiologic Disorder: No


Hx Anxiety: No


Hx Bipolar Disorder: No


Hx Depression: No


Hx Emotional Abuse: No


Hx Hallucinations: No


Hx Panic Disorder: No


Hx Post Traumatic Stress Disorder: No


Hx Psychosis: No


Hx Physical Abuse: No


Hx Schizophrenia: No


Hx Sexual Abuse: No


Hx Substance Use: No





- Surgical History


Hx Inguinal Hernia Repair: Yes (l)





- Anesthesia


Hx Anesthesia: Yes


Hx Anesthesia Reactions: No


Hx Malignant Hyperthermia: No





- Suicidal Assessment


Feels Threatened In Home Enviroment: No





Family/Social History





- Physician Review


Nursing Documentation Reviewed: Yes


Family/Social History: Other (nocontributory)


Smoking Status: Heavy Smoker > 10 Cigarettes Daily


Hx Alcohol Use: Yes


Hx Substance Use: No


Hx Substance Use Treatment: No





Allergies/Home Meds


Allergies/Adverse Reactions: 


Allergies





No Known Allergies Allergy (Verified 10/01/15 14:36)


 








Home Medications: 


 Home Meds











 Medication  Instructions  Recorded  Confirmed


 


Losartan/Hydrochlorothiazide 1 tab PO DAILY 01/02/18 05/23/18





[Losartan-Hctz 100-25 mg Tab]   














Review of Systems





- Review of Systems


Constitutional: Normal.  absent: Fatigue, Weight Change, Fevers


Eyes: Normal


ENT: Other (see hpi)


Respiratory: Normal


Cardiovascular: Normal


Gastrointestinal: Normal


Genitourinary Male: Normal


Musculoskeletal: Normal


Skin: Normal


Neurological: Normal


Endocrine: Normal


Hemo/Lymphatic: Normal


Psychiatric: Normal





Physical Exam





Vital Signs











  Temp Pulse Resp BP Pulse Ox


 


 05/27/18 09:43  99.4 F  98 H  20  155/103 H  96











Temperature: Afebrile


Blood Pressure: Normal


Pulse: Regular


Respiratory Rate: Normal


Appearance: Positive for: Well-Appearing, Non-Toxic, Comfortable


Pain Distress: None


Mental Status: Positive for: Alert and Oriented X 3





- Systems Exam


Head: Present: Atraumatic, Normocephalic, Other (no raccoon sign.  No lizarraga 

sign)


Pupils: Present: PERRL, Other (no hyphema)


Extroacular Muscles: Present: EOMI.  No: Entrapment


Conjunctiva: Present: Normal


Ears: Present: Normal, NORMAL TM, Normal Canal, Other (no hemotymapnum).  No: 

Erythema, TM Bulging, Fluid, TM Perf


Mouth: Present: Moist Mucous Membranes, Normal Tounge, Normal Teeth, Other ((+) 

healing inner upper lip laceration, mils swelling.  No fluctuance, no drainage.

  No facial erythema.  No dental or gum abscess.  No dental tenderness).  No: 

Drooling, Trismus


Pharnyx: Present: Normal.  No: ERYTHEMA, EXUDATE, TONSILS ENLARGED


Neck: Present: Normal Range of Motion


Respiratory/Chest: Present: Clear to Auscultation, Good Air Exchange.  No: 

Respiratory Distress, Accessory Muscle Use, Wheezes, Retracting, Rhonchi, 

Tachypneic


Cardiovascular: Present: Regular Rate and Rhythm, Normal S1, S2.  No: Murmurs


Upper Extremity: Present: Normal Inspection, Normal ROM


Lower Extremity: Present: Normal Inspection, Normal ROM


Neurological: Present: GCS=15, CN II-XII Intact, Speech Normal, Motor Func 

Grossly Intact, Normal Sensory Function, Normal Cerebellar Funct, Gait Normal, 

Memory Normal


Skin: Present: Warm, Dry, Normal Color.  No: Rashes


Psychiatric: Present: Alert, Oriented x 3, Normal Insight





Medical Decision Making


ED Course and Treatment: 





05/27/18 10:51


Re-evaluation.  Patient feels better.  Discussed results and plan with patient 

who expresses understanding.  All questions answered and there is agreement 

with the plan to discharge home with instructions.  Patient stable for 

discharge.  Return if symptoms persist or worsen





Re-evaluation Time: 10:51


Reassessment Condition: Re-examined, Unchanged





Disposition/Present on Arrival





- Present on Arrival


Any Indicators Present on Arrival: No


History of DVT/PE: No


History of Uncontrolled Diabetes: No


Urinary Catheter: No


History of Decub. Ulcer: No


History Surgical Site Infection Following: None





- Disposition


Have Diagnosis and Disposition been Completed?: Yes


Diagnosis: 


 Infected wound, Hypertension





Disposition: HOME/ ROUTINE


Disposition Time: 10:52


Patient Plan: Discharge


Condition: GOOD


Discharge Instructions (ExitCare):  High Blood Pressure (DC), Low Salt Diet


Print Language: Tajik


Additional Instructions: 


Llame al doctor the la clinica para seguimiento medico.  Cerulean la medicina com 

esta prescrita.  Limpie la herida con agua y jabon.  Regrese a la emergencia si 

infection empeora.  limpie carole dientes alfonzo, y utilize el floss


Prescriptions: 


Cephalexin [Keflex] 500 mg PO QID #28 capsule


Chlorhexidine 0.12% [Peridex] 15 ml PO BID #1 bottle


Sulfamethoxazole/Trimethoprim [Bactrim  mg-160 mg] 1 tab PO BID #14 tab


Referrals: 


 Service [Outside] - Follow up with primary


[facility] [Outside] - Follow up with primary

## 2018-10-29 ENCOUNTER — HOSPITAL ENCOUNTER (EMERGENCY)
Dept: HOSPITAL 42 - ED | Age: 63
Discharge: HOME | End: 2018-10-29
Payer: SELF-PAY

## 2018-10-29 VITALS — BODY MASS INDEX: 40.6 KG/M2

## 2018-10-29 VITALS — DIASTOLIC BLOOD PRESSURE: 87 MMHG | SYSTOLIC BLOOD PRESSURE: 129 MMHG

## 2018-10-29 VITALS — OXYGEN SATURATION: 98 % | TEMPERATURE: 98.2 F | HEART RATE: 86 BPM | RESPIRATION RATE: 18 BRPM

## 2018-10-29 DIAGNOSIS — I10: ICD-10-CM

## 2018-10-29 DIAGNOSIS — F17.210: ICD-10-CM

## 2018-10-29 DIAGNOSIS — F10.129: Primary | ICD-10-CM
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
## 2018-10-29 LAB
ALBUMIN SERPL-MCNC: 3.9 G/DL (ref 3–4.8)
ALBUMIN/GLOB SERPL: 1.4 {RATIO} (ref 1.1–1.8)
ALT SERPL-CCNC: 40 U/L (ref 7–56)
APTT BLD: 25.4 SECONDS (ref 25.1–36.5)
AST SERPL-CCNC: 35 U/L (ref 17–59)
BASOPHILS # BLD AUTO: 0.02 K/MM3 (ref 0–2)
BASOPHILS NFR BLD: 0.3 % (ref 0–3)
BUN SERPL-MCNC: 14 MG/DL (ref 7–21)
CALCIUM SERPL-MCNC: 8.2 MG/DL (ref 8.4–10.5)
EOSINOPHIL # BLD: 0.2 10*3/UL (ref 0–0.7)
EOSINOPHIL NFR BLD: 2.5 % (ref 1.5–5)
ERYTHROCYTE [DISTWIDTH] IN BLOOD BY AUTOMATED COUNT: 13.6 % (ref 11.5–14.5)
GFR NON-AFRICAN AMERICAN: > 60
GRANULOCYTES # BLD: 3.88 10*3/UL (ref 1.4–6.5)
GRANULOCYTES NFR BLD: 56.1 % (ref 50–68)
HGB BLD-MCNC: 16 G/DL (ref 14–18)
INR PPP: 1.02
LYMPHOCYTES # BLD: 2.5 10*3/UL (ref 1.2–3.4)
LYMPHOCYTES NFR BLD AUTO: 35.9 % (ref 22–35)
MCH RBC QN AUTO: 30.7 PG (ref 25–35)
MCHC RBC AUTO-ENTMCNC: 34 G/DL (ref 31–37)
MCV RBC AUTO: 90.4 FL (ref 80–105)
MONOCYTES # BLD AUTO: 0.4 10*3/UL (ref 0.1–0.6)
MONOCYTES NFR BLD: 5.2 % (ref 1–6)
PLATELET # BLD: 223 10^3/UL (ref 120–450)
PMV BLD AUTO: 9.1 FL (ref 7–11)
PROTHROMBIN TIME: 11.6 SECONDS (ref 9.4–12.5)
RBC # BLD AUTO: 5.21 10^6/UL (ref 3.5–6.1)
WBC # BLD AUTO: 6.9 10^3/UL (ref 4.5–11)

## 2018-10-29 PROCEDURE — 99283 EMERGENCY DEPT VISIT LOW MDM: CPT

## 2018-10-29 PROCEDURE — 85025 COMPLETE CBC W/AUTO DIFF WBC: CPT

## 2018-10-29 PROCEDURE — 80053 COMPREHEN METABOLIC PANEL: CPT

## 2018-10-29 PROCEDURE — 85730 THROMBOPLASTIN TIME PARTIAL: CPT

## 2018-10-29 PROCEDURE — 85610 PROTHROMBIN TIME: CPT

## 2018-10-29 NOTE — ED PDOC
Arrival/HPI





- General


Chief Complaint: Alcohol Ingestion


Time Seen by Provider: 10/29/18 17:45


Historian: Patient





- History of Present Illness


Narrative History of Present Illness (Text): 





10/30/18 01:29


64yo male with pmhx of alcohol intoxication and hypertension bib EMS for alcohol

intoxication. Patient states " I drink too much". Per the EMS patient complained

of dizziness when he went to visit his neighbor and then the neighbor called 

EMS.  They stated that pt however reports drinking alcohol . Patient states the 

alcohol : make me not feel good". He however denies any focal complaint. Denies 

chest pain, headache, dizziness, nausea, abdominal pain, back pain, SOB, 

diaphoresis, SI/HI, any other complaint.














Past Medical History





- Provider Review


Nursing Documentation Reviewed: Yes





- Infectious Disease


Hx of Infectious Diseases: None





- Cardiac


Hx Cardiac Disorders: Yes


Hx Hypertension: Yes





- Pulmonary


Hx Respiratory Disorders: No





- Neurological


Hx Neurological Disorder: No





- HEENT


Hx HEENT Disorder: Yes (EYE IRRITATION FROM DUST AT WORK)





- Renal


Hx Renal Disorder: No





- Endocrine/Metabolic


Hx Endocrine Disorders: No





- Hematological/Oncological


Hx Blood Disorders: No





- Integumentary


Hx Dermatological Disorder: No





- Musculoskeletal/Rheumatological


Hx Musculoskeletal Disorders: Yes (KNEE PAIN)





- Gastrointestinal


Hx Gastrointestinal Disorders: No





- Genitourinary/Gynecological


Hx Genitourinary Disorders: Yes


Hx Prostate Problems: Yes (ELEVATED PSA/BPH)





- Psychiatric


Hx Psychophysiologic Disorder: No


Hx Anxiety: No


Hx Bipolar Disorder: No


Hx Depression: No


Hx Emotional Abuse: No


Hx Hallucinations: No


Hx Panic Disorder: No


Hx Post Traumatic Stress Disorder: No


Hx Psychosis: No


Hx Physical Abuse: No


Hx Schizophrenia: No


Hx Sexual Abuse: No


Hx Substance Use: No





- Surgical History


Hx Inguinal Hernia Repair: Yes (l)





- Anesthesia


Hx Anesthesia: Yes


Hx Anesthesia Reactions: No


Hx Malignant Hyperthermia: No





- Suicidal Assessment


Feels Threatened In Home Enviroment: No





Family/Social History





- Physician Review


Nursing Documentation Reviewed: Yes


Family/Social History: Unknown Family HX


Smoking Status: Heavy Smoker > 10 Cigarettes Daily


Hx Alcohol Use: Yes


Hx Substance Use: No


Hx Substance Use Treatment: No





Allergies/Home Meds


Allergies/Adverse Reactions: 


Allergies





No Known Allergies Allergy (Verified 10/01/15 14:36)


   








Home Medications: 


                                    Home Meds











 Medication  Instructions  Recorded  Confirmed


 


Losartan/Hydrochlorothiazide 1 tab PO DAILY 01/02/18 05/23/18





[Losartan-Hctz 100-25 mg Tab]   














Review of Systems





- Physician Review


All systems were reviewed & negative as marked: Yes





- Review of Systems


Systems not reviewed;Unavailable: Intoxicated


Constitutional: Normal


Eyes: Normal


ENT: Normal


Respiratory: Normal


Cardiovascular: Normal


Gastrointestinal: Normal


Genitourinary Male: Normal


Musculoskeletal: Normal


Skin: Normal


Neurological: Normal


Endocrine: Normal


Hemo/Lymphatic: Normal


Psychiatric: Normal





Physical Exam





- Physical Exam


Physical Exam Limitations: Intoxication


Vital Signs Reviewed: Yes





Vital Signs











  Temp Pulse Resp BP Pulse Ox


 


 10/29/18 18:34  98.2 F  86  18  141/81  98











Temperature: Afebrile


Blood Pressure: Normal


Pulse: Regular


Respiratory Rate: Normal


Appearance: Positive for: Well-Appearing, Non-Toxic, Comfortable


Pain Distress: None


Mental Status: Positive for: Alert and Oriented X 3





- Systems Exam


Head: Present: Atraumatic, Normocephalic


Pupils: Present: PERRL


Extroacular Muscles: Present: EOMI


Conjunctiva: Present: Normal


Mouth: Present: Moist Mucous Membranes


Neck: Present: Normal Range of Motion


Respiratory/Chest: Present: Clear to Auscultation, Good Air Exchange.  No: 

Respiratory Distress, Accessory Muscle Use


Cardiovascular: Present: Regular Rate and Rhythm, Normal S1, S2.  No: Murmurs


Abdomen: No: Tenderness, Distention, Peritoneal Signs


Back: Present: Normal Inspection


Upper Extremity: Present: Normal Inspection.  No: Cyanosis, Edema


Lower Extremity: Present: Normal Inspection.  No: Edema


Neurological: Present: GCS=15, CN II-XII Intact, Speech Normal


Skin: Present: Warm, Dry, Normal Color.  No: Rashes


Psychiatric: Present: Alert, Oriented x 3, Normal Insight, Normal Concentration





Medical Decision Making


ED Course and Treatment: 





10/30/18 01:34


64yo male in ED for alcohol intoxication





Labs


alcohol level








Monitor in ED for sobriety





Pt's alcohol level was 286





He was observed in ED for couple of hours. He was noted sleeping calmly in ED





Pt started ambulating in ED with steady gait after some hours. He was AAO x3 and

in no distress. He asked for medication for headache and abdominal pain and it 

was given. On review he was smiling, requesting to be DC home. He denies SI/HI, 

any other complaint.





He was stable for DC.











- Lab Interpretations


Lab Results: 











                                 10/29/18 18:03 





                                 10/29/18 18:03 





                                   Lab Results





10/29/18 18:03: Alcohol, Quantitative 286 H


10/29/18 18:03: Sodium 145, Potassium 4.2, Chloride 107, Carbon Dioxide 30, 

Anion Gap 12, BUN 14, Creatinine 0.7 L, Est GFR ( Amer) > 60, Est GFR 

(Non-Af Amer) > 60, Random Glucose 105, Calcium 8.2 L, Total Bilirubin 0.4, AST 

35, ALT 40, Alkaline Phosphatase 61, Total Protein 6.6, Albumin 3.9, Globulin 

2.7, Albumin/Globulin Ratio 1.4


10/29/18 18:03: PT 11.6, INR 1.02, APTT 25.4


10/29/18 18:03: WBC 6.9  D, RBC 5.21, Hgb 16.0, Hct 47.1, MCV 90.4  D, MCH 30.7,

MCHC 34.0, RDW 13.6, Plt Count 223, MPV 9.1, Gran % 56.1, Lymph % (Auto) 35.9 H,

Mono % (Auto) 5.2, Eos % (Auto) 2.5, Baso % (Auto) 0.3, Gran # 3.88, Lymph # 

(Auto) 2.5, Mono # (Auto) 0.4, Eos # (Auto) 0.2, Baso # (Auto) 0.02











- Medication Orders


Current Medication Orders: 














Discontinued Medications





Acetaminophen (Tylenol 325mg Tab)  650 mg PO STAT STA


   Stop: 10/29/18 21:29


   Last Admin: 10/29/18 21:40  Dose: 650 mg





MAR Pain/Vitals


 Document     10/29/18 21:40  SS  (Rec: 10/29/18 21:40  SS  [id number])


     Pain Reassessment


      Is This A Pain ReAssessment?               No


     Sleep


      Is patient sleeping during reassessment?   No


     Presence of Pain


      Presence of Pain                           Yes





Famotidine (Pepcid)  40 mg PO STAT STA


   Stop: 10/29/18 21:25


   Last Admin: 10/29/18 21:39  Dose: 40 mg











Disposition/Present on Arrival





- Present on Arrival


Any Indicators Present on Arrival: No


History of DVT/PE: No


History of Uncontrolled Diabetes: No


Urinary Catheter: No


History of Decub. Ulcer: No


History Surgical Site Infection Following: None





- Disposition


Have Diagnosis and Disposition been Completed?: Yes


Diagnosis: 


 Alcohol intoxication





Disposition: HOME/ ROUTINE


Disposition Time: 21:45


Patient Plan: Discharge


Condition: STABLE


Discharge Instructions (ExitCare):  Alcohol Abuse and Alcoholism (DC)


Additional Instructions: 


Follow up with your Doctor


Return to ED for any new or worsening symptoms


Referrals: 


Alcoholics Anonymous [Outside] - Follow up with primary


[facility] [Outside] - Follow up with primary


Forms:  [employer] (English)